# Patient Record
Sex: FEMALE | Race: WHITE | Employment: UNEMPLOYED | URBAN - METROPOLITAN AREA
[De-identification: names, ages, dates, MRNs, and addresses within clinical notes are randomized per-mention and may not be internally consistent; named-entity substitution may affect disease eponyms.]

---

## 2022-03-18 PROBLEM — F39 MOOD DISORDER (HCC): Status: ACTIVE | Noted: 2018-04-08

## 2022-03-18 PROBLEM — K92.2 ACUTE LOWER GI BLEEDING: Status: ACTIVE | Noted: 2018-04-08

## 2022-03-19 PROBLEM — R45.89 SUICIDAL BEHAVIOR: Status: ACTIVE | Noted: 2018-04-07

## 2022-03-19 PROBLEM — R45.851 SUICIDAL IDEATIONS: Status: ACTIVE | Noted: 2018-04-07

## 2022-03-19 PROBLEM — R45.851 SUICIDAL IDEATION: Status: ACTIVE | Noted: 2018-04-08

## 2022-03-19 PROBLEM — F32.9 MAJOR DEPRESSIVE DISORDER: Status: ACTIVE | Noted: 2018-04-10

## 2023-06-17 ENCOUNTER — HOSPITAL ENCOUNTER (EMERGENCY)
Age: 43
Discharge: HOME | End: 2023-06-17
Attending: EMERGENCY MEDICINE
Payer: MEDICARE

## 2023-06-17 VITALS
SYSTOLIC BLOOD PRESSURE: 120 MMHG | RESPIRATION RATE: 18 BRPM | HEART RATE: 85 BPM | OXYGEN SATURATION: 95 % | DIASTOLIC BLOOD PRESSURE: 70 MMHG | BODY MASS INDEX: 33.32 KG/M2 | TEMPERATURE: 98.6 F | HEIGHT: 65 IN | WEIGHT: 200 LBS

## 2023-06-17 DIAGNOSIS — A59.9 TRICHIMONIASIS: ICD-10-CM

## 2023-06-17 DIAGNOSIS — R55 SYNCOPE, UNSPECIFIED SYNCOPE TYPE: Primary | ICD-10-CM

## 2023-06-17 DIAGNOSIS — R73.9 HYPERGLYCEMIA: ICD-10-CM

## 2023-06-17 LAB
ANION GAP SERPL CALCULATED.3IONS-SCNC: 15 MMOL/L (ref 3–16)
BACTERIA URNS QL MICRO: ABNORMAL /HPF
BASE EXCESS BLDV CALC-SCNC: 1.6 MMOL/L (ref -2–3)
BASOPHILS # BLD: 0.1 K/UL (ref 0–0.2)
BASOPHILS NFR BLD: 1.4 %
BILIRUB UR QL STRIP.AUTO: NEGATIVE
BUN SERPL-MCNC: 14 MG/DL (ref 7–20)
CALCIUM SERPL-MCNC: 9.9 MG/DL (ref 8.3–10.6)
CHLORIDE SERPL-SCNC: 94 MMOL/L (ref 99–110)
CLARITY UR: CLEAR
CO2 BLDV-SCNC: 29 MMOL/L
CO2 SERPL-SCNC: 23 MMOL/L (ref 21–32)
COHGB MFR BLDV: 1.4 % (ref 0–1.5)
COLOR UR: YELLOW
CREAT SERPL-MCNC: 0.8 MG/DL (ref 0.6–1.1)
DEPRECATED RDW RBC AUTO: 12.9 % (ref 12.4–15.4)
DO-HGB MFR BLDV: 42.5 %
EOSINOPHIL # BLD: 0.2 K/UL (ref 0–0.6)
EOSINOPHIL NFR BLD: 3.2 %
EPI CELLS #/AREA URNS HPF: ABNORMAL /HPF (ref 0–5)
GFR SERPLBLD CREATININE-BSD FMLA CKD-EPI: >60 ML/MIN/{1.73_M2}
GLUCOSE BLD-MCNC: 255 MG/DL (ref 70–99)
GLUCOSE BLD-MCNC: 287 MG/DL (ref 70–99)
GLUCOSE BLD-MCNC: 372 MG/DL (ref 70–99)
GLUCOSE SERPL-MCNC: 403 MG/DL (ref 70–99)
GLUCOSE UR STRIP.AUTO-MCNC: >=1000 MG/DL
HCO3 BLDV-SCNC: 27.2 MMOL/L (ref 24–28)
HCT VFR BLD AUTO: 41 % (ref 36–48)
HGB BLD-MCNC: 14 G/DL (ref 12–16)
HGB UR QL STRIP.AUTO: ABNORMAL
KETONES UR STRIP.AUTO-MCNC: NEGATIVE MG/DL
LEUKOCYTE ESTERASE UR QL STRIP.AUTO: ABNORMAL
LYMPHOCYTES # BLD: 2.6 K/UL (ref 1–5.1)
LYMPHOCYTES NFR BLD: 38.3 %
MCH RBC QN AUTO: 29.9 PG (ref 26–34)
MCHC RBC AUTO-ENTMCNC: 34.1 G/DL (ref 31–36)
MCV RBC AUTO: 87.8 FL (ref 80–100)
METHGB MFR BLDV: 0.4 % (ref 0–1.5)
MONOCYTES # BLD: 0.4 K/UL (ref 0–1.3)
MONOCYTES NFR BLD: 6 %
NEUTROPHILS # BLD: 3.4 K/UL (ref 1.7–7.7)
NEUTROPHILS NFR BLD: 51.1 %
NITRITE UR QL STRIP.AUTO: NEGATIVE
PCO2 BLDV: 45.2 MMHG (ref 41–51)
PERFORMED ON: ABNORMAL
PH BLDV: 7.39 [PH] (ref 7.35–7.45)
PH UR STRIP.AUTO: 6 [PH] (ref 5–8)
PLATELET # BLD AUTO: 237 K/UL (ref 135–450)
PMV BLD AUTO: 8.7 FL (ref 5–10.5)
PO2 BLDV: 31.5 MMHG (ref 25–40)
POTASSIUM SERPL-SCNC: 4.4 MMOL/L (ref 3.5–5.1)
PROT UR STRIP.AUTO-MCNC: NEGATIVE MG/DL
RBC # BLD AUTO: 4.67 M/UL (ref 4–5.2)
RBC #/AREA URNS HPF: ABNORMAL /HPF (ref 0–4)
SAO2 % BLDV: 57 %
SODIUM SERPL-SCNC: 132 MMOL/L (ref 136–145)
SP GR UR STRIP.AUTO: 1.01 (ref 1–1.03)
TRICHOMONAS #/AREA URNS HPF: PRESENT /HPF
UA COMPLETE W REFLEX CULTURE PNL UR: YES
UA DIPSTICK W REFLEX MICRO PNL UR: YES
URN SPEC COLLECT METH UR: ABNORMAL
UROBILINOGEN UR STRIP-ACNC: 0.2 E.U./DL
WBC # BLD AUTO: 6.7 K/UL (ref 4–11)
WBC #/AREA URNS HPF: ABNORMAL /HPF (ref 0–5)

## 2023-06-17 PROCEDURE — 93005 ELECTROCARDIOGRAM TRACING: CPT | Performed by: EMERGENCY MEDICINE

## 2023-06-17 PROCEDURE — 2580000003 HC RX 258: Performed by: EMERGENCY MEDICINE

## 2023-06-17 PROCEDURE — 80048 BASIC METABOLIC PNL TOTAL CA: CPT

## 2023-06-17 PROCEDURE — 99284 EMERGENCY DEPT VISIT MOD MDM: CPT

## 2023-06-17 PROCEDURE — 81001 URINALYSIS AUTO W/SCOPE: CPT

## 2023-06-17 PROCEDURE — 87086 URINE CULTURE/COLONY COUNT: CPT

## 2023-06-17 PROCEDURE — 96372 THER/PROPH/DIAG INJ SC/IM: CPT

## 2023-06-17 PROCEDURE — 96360 HYDRATION IV INFUSION INIT: CPT

## 2023-06-17 PROCEDURE — 6370000000 HC RX 637 (ALT 250 FOR IP): Performed by: EMERGENCY MEDICINE

## 2023-06-17 PROCEDURE — 82803 BLOOD GASES ANY COMBINATION: CPT

## 2023-06-17 PROCEDURE — 85025 COMPLETE CBC W/AUTO DIFF WBC: CPT

## 2023-06-17 RX ORDER — SODIUM CHLORIDE, SODIUM LACTATE, POTASSIUM CHLORIDE, AND CALCIUM CHLORIDE .6; .31; .03; .02 G/100ML; G/100ML; G/100ML; G/100ML
1000 INJECTION, SOLUTION INTRAVENOUS ONCE
Status: COMPLETED | OUTPATIENT
Start: 2023-06-17 | End: 2023-06-17

## 2023-06-17 RX ORDER — OXYCODONE HYDROCHLORIDE 5 MG/1
10 TABLET ORAL ONCE
Status: COMPLETED | OUTPATIENT
Start: 2023-06-17 | End: 2023-06-17

## 2023-06-17 RX ORDER — METRONIDAZOLE 500 MG/1
2000 TABLET ORAL ONCE
Status: COMPLETED | OUTPATIENT
Start: 2023-06-17 | End: 2023-06-17

## 2023-06-17 RX ORDER — METRONIDAZOLE 500 MG/1
2000 TABLET ORAL ONCE
Qty: 4 TABLET | Refills: 0 | Status: SHIPPED | OUTPATIENT
Start: 2023-06-17 | End: 2023-06-17 | Stop reason: CLARIF

## 2023-06-17 RX ADMIN — METRONIDAZOLE 2000 MG: 500 TABLET ORAL at 14:01

## 2023-06-17 RX ADMIN — INSULIN HUMAN 4 UNITS: 100 INJECTION, SOLUTION PARENTERAL at 13:03

## 2023-06-17 RX ADMIN — SODIUM CHLORIDE, POTASSIUM CHLORIDE, SODIUM LACTATE AND CALCIUM CHLORIDE 1000 ML: 600; 310; 30; 20 INJECTION, SOLUTION INTRAVENOUS at 11:24

## 2023-06-17 RX ADMIN — OXYCODONE HYDROCHLORIDE 10 MG: 5 TABLET ORAL at 13:03

## 2023-06-17 ASSESSMENT — ENCOUNTER SYMPTOMS
VOMITING: 0
COUGH: 0
ABDOMINAL PAIN: 0
SHORTNESS OF BREATH: 0
NAUSEA: 0
DIARRHEA: 1

## 2023-06-17 ASSESSMENT — PAIN - FUNCTIONAL ASSESSMENT
PAIN_FUNCTIONAL_ASSESSMENT: 0-10
PAIN_FUNCTIONAL_ASSESSMENT: 0-10

## 2023-06-17 ASSESSMENT — PAIN SCALES - GENERAL
PAINLEVEL_OUTOF10: 4
PAINLEVEL_OUTOF10: 9

## 2023-06-17 ASSESSMENT — PAIN DESCRIPTION - PAIN TYPE: TYPE: CHRONIC PAIN

## 2023-06-17 ASSESSMENT — LIFESTYLE VARIABLES
HOW MANY STANDARD DRINKS CONTAINING ALCOHOL DO YOU HAVE ON A TYPICAL DAY: PATIENT DOES NOT DRINK
HOW OFTEN DO YOU HAVE A DRINK CONTAINING ALCOHOL: NEVER

## 2023-06-17 ASSESSMENT — PAIN DESCRIPTION - ORIENTATION: ORIENTATION: LEFT

## 2023-06-17 ASSESSMENT — PAIN DESCRIPTION - DESCRIPTORS: DESCRIPTORS: ACHING

## 2023-06-17 NOTE — DISCHARGE INSTRUCTIONS
Ensure that the patient is getting her actual prescribed dose of scheduled insulin as well as sliding scale insulin.

## 2023-06-17 NOTE — ED PROVIDER NOTES
Present (A) None Seen /HPF   POCT Glucose   Result Value Ref Range    POC Glucose 372 (H) 70 - 99 mg/dl    Performed on ACCU-CHEK    POCT Glucose   Result Value Ref Range    POC Glucose 255 (H) 70 - 99 mg/dl    Performed on ACCU-CHEK      EKG   Interpreted by Gopi Mendez MD     Rhythm: sinus tach   Rate: 107  Axis: left  Ectopy: none  Conduction: LAHB  ST Segments: no acute change  T Waves: no acute change  Q Waves: none    Clinical Impression: Sinus tach      ED BEDSIDE ULTRASOUND:  No results found. MOST RECENT VITALS:  BP: 116/67,Temp: 98.6 °F (37 °C), Pulse: 100, Respirations: 18, SpO2: 94 %     ED Course     Nursing Notes, Past Medical Hx, Past Surgical Hx, Social Hx,Allergies, and Family Hx were reviewed. The patient was given the following medications:  Orders Placed This Encounter   Medications    lactated ringers bolus    oxyCODONE (ROXICODONE) immediate release tablet 10 mg    DISCONTD: insulin regular (HUMULIN R;NOVOLIN R) injection 10 Units    insulin regular (HUMULIN R;NOVOLIN R) injection 4 Units    DISCONTD: metroNIDAZOLE (FLAGYL) 500 MG tablet     Sig: Take 4 tablets by mouth once for 1 dose Do not drink alcohol 4 days before to 4 days after taking. Dispense:  4 tablet     Refill:  0    metroNIDAZOLE (FLAGYL) tablet 2,000 mg     Order Specific Question:   Antimicrobial Indications     Answer:   OB/Gyn Infection       CONSULTS:  None    Review of Systems     Review of Systems   Constitutional:  Negative for chills and fever. Respiratory:  Negative for cough and shortness of breath. Cardiovascular:  Negative for chest pain and palpitations. Gastrointestinal:  Positive for diarrhea. Negative for abdominal pain, nausea and vomiting. Genitourinary:  Negative for dysuria. Neurological:  Positive for syncope. Negative for weakness and light-headedness. All other systems reviewed and are negative.     Past Medical, Surgical, Family, and Social History     She has a past

## 2023-06-18 LAB
BACTERIA UR CULT: NORMAL
EKG ATRIAL RATE: 107 BPM
EKG DIAGNOSIS: NORMAL
EKG P AXIS: 47 DEGREES
EKG P-R INTERVAL: 140 MS
EKG Q-T INTERVAL: 350 MS
EKG QRS DURATION: 84 MS
EKG QTC CALCULATION (BAZETT): 467 MS
EKG R AXIS: -46 DEGREES
EKG T AXIS: 38 DEGREES
EKG VENTRICULAR RATE: 107 BPM